# Patient Record
Sex: FEMALE | Race: WHITE
[De-identification: names, ages, dates, MRNs, and addresses within clinical notes are randomized per-mention and may not be internally consistent; named-entity substitution may affect disease eponyms.]

---

## 2018-11-07 ENCOUNTER — HOSPITAL ENCOUNTER (OUTPATIENT)
Dept: HOSPITAL 95 - ORSCSDS | Age: 73
Discharge: HOME | End: 2018-11-07
Attending: INTERNAL MEDICINE
Payer: MEDICARE

## 2018-11-07 VITALS — HEIGHT: 62.01 IN | WEIGHT: 232.61 LBS | BODY MASS INDEX: 42.26 KG/M2

## 2018-11-07 DIAGNOSIS — G47.33: ICD-10-CM

## 2018-11-07 DIAGNOSIS — K21.9: ICD-10-CM

## 2018-11-07 DIAGNOSIS — K22.2: ICD-10-CM

## 2018-11-07 DIAGNOSIS — K29.00: ICD-10-CM

## 2018-11-07 DIAGNOSIS — E78.5: ICD-10-CM

## 2018-11-07 DIAGNOSIS — Z79.899: ICD-10-CM

## 2018-11-07 DIAGNOSIS — R13.10: Primary | ICD-10-CM

## 2018-11-07 DIAGNOSIS — E66.9: ICD-10-CM

## 2018-11-07 DIAGNOSIS — I10: ICD-10-CM

## 2018-11-07 DIAGNOSIS — K29.80: ICD-10-CM

## 2018-11-07 PROCEDURE — 0DB98ZX EXCISION OF DUODENUM, VIA NATURAL OR ARTIFICIAL OPENING ENDOSCOPIC, DIAGNOSTIC: ICD-10-PCS | Performed by: INTERNAL MEDICINE

## 2018-11-07 PROCEDURE — 0D758ZZ DILATION OF ESOPHAGUS, VIA NATURAL OR ARTIFICIAL OPENING ENDOSCOPIC: ICD-10-PCS | Performed by: INTERNAL MEDICINE

## 2018-11-07 PROCEDURE — 0DB68ZX EXCISION OF STOMACH, VIA NATURAL OR ARTIFICIAL OPENING ENDOSCOPIC, DIAGNOSTIC: ICD-10-PCS | Performed by: INTERNAL MEDICINE

## 2021-12-08 NOTE — NUR
SHIFT SUMMARY:
POD 0 LEFT TOTAL KNEE
NO SIGNIFICANT CHANGES SINCE COMING BACK FROM PACU TODAY 12/8/21 AT AROUND
1540.
SHE IS ALERT AND ORIENTED X4. VS ARE WNL AND IS ON RA. PAIN IS MANAGED WITH
TYLENOL AND TORADOL. SHE IS A SBA WITH FWW AND GAIT BELT. LEFT KNEE HAS ACE
WRAP AND AQUACEL THAT IS C/D/I. SHE HAS FULL SENSATION TO ALL EXTREMITIES. SHE
CAN MOVE FINGERS AND TOES. TOLERATES PO INTAKE AND IS VOIDING.
CALLS APPROPRIATELY. CALL LIGHT WITHIN REACH.

## 2021-12-09 NOTE — NUR
SHIFT SUMMARY
 
POD1 L TOTAL KNEE ARTHROPLASTY, AQUACEL AND ACE WRAP IN PLACE C/D/I, SLIGHT
SWELLING. POLAR PACK IN PLACE. VOIDING WELL. AMBULATING WITH STAND BY ASSIST,
GB AND FWW. NO PAIN REPORTED THIS SHIFT. WILL REPORT TO ONCOMING RN.

## 2021-12-09 NOTE — NUR
1018 PT DISCHARGED TO HOME WITH . PT AMBULATING IN ROOM AND SOTELO WITH
WALKER AND STANDBY ASSIST. PT ABLE TO GIVE OWN LOVENOX INJECTION. TOLERATING
PO FOOD AND FLUID WITHOUT NAUSEA. PT VERBALIZES UNDERSTANDING OF DISCHARGE
INSTRUCTIONS